# Patient Record
Sex: FEMALE | Race: WHITE | NOT HISPANIC OR LATINO | Employment: OTHER | URBAN - METROPOLITAN AREA
[De-identification: names, ages, dates, MRNs, and addresses within clinical notes are randomized per-mention and may not be internally consistent; named-entity substitution may affect disease eponyms.]

---

## 2021-02-24 ENCOUNTER — OFFICE VISIT (OUTPATIENT)
Dept: INTERNAL MEDICINE | Facility: CLINIC | Age: 56
End: 2021-02-24

## 2021-02-24 VITALS
TEMPERATURE: 96.9 F | DIASTOLIC BLOOD PRESSURE: 72 MMHG | SYSTOLIC BLOOD PRESSURE: 120 MMHG | HEART RATE: 64 BPM | HEIGHT: 60 IN | WEIGHT: 113 LBS | BODY MASS INDEX: 22.19 KG/M2

## 2021-02-24 DIAGNOSIS — E78.49 OTHER HYPERLIPIDEMIA: ICD-10-CM

## 2021-02-24 DIAGNOSIS — M81.8 OTHER OSTEOPOROSIS WITHOUT CURRENT PATHOLOGICAL FRACTURE: Primary | ICD-10-CM

## 2021-02-24 PROBLEM — M85.89 OSTEOPENIA OF MULTIPLE SITES: Status: ACTIVE | Noted: 2021-02-24

## 2021-02-24 PROCEDURE — 99203 OFFICE O/P NEW LOW 30 MIN: CPT | Performed by: INTERNAL MEDICINE

## 2021-02-24 RX ORDER — MEDROXYPROGESTERONE ACETATE 5 MG/1
TABLET ORAL
COMMUNITY

## 2021-02-24 RX ORDER — ESTRADIOL 0.5 MG/1
0.5 TABLET ORAL DAILY
COMMUNITY
Start: 2020-11-27

## 2021-02-24 NOTE — PROGRESS NOTES
Patient is a 55 y.o. female who is here to establish care for hyperlipidemia and osteoporsis.  Chief Complaint   Patient presents with   • Hyperlipidemia         HPI:    Here for evaluation of osteoporosis.  Has history of osteopenia back in 2017 and repeat in 2020 has had progression to osteoporosis.  Has started exercise regimen and increase in Ca/vit D.  Does not have FH of fragility fracture.    Also has hx of hyperlipidemia.  Has a strong FH of CAD.  Tries to eat better.     History:     Patient Active Problem List   Diagnosis   • Other osteoporosis without current pathological fracture   • Other hyperlipidemia       No past medical history on file.    Past Surgical History:   Procedure Laterality Date   •  SECTION     • CYST REMOVAL         Current Outpatient Medications on File Prior to Visit   Medication Sig   • estradiol (ESTRACE) 0.5 MG tablet Take 0.5 mg by mouth Daily.   • medroxyPROGESTERone (PROVERA) 5 MG tablet medroxyprogesterone 5 mg tablet     No current facility-administered medications on file prior to visit.        Family History   Problem Relation Age of Onset   • Hearing loss Father    • Heart attack Father        Social History     Socioeconomic History   • Marital status:      Spouse name: Not on file   • Number of children: Not on file   • Years of education: Not on file   • Highest education level: Not on file   Tobacco Use   • Smoking status: Never Smoker   • Smokeless tobacco: Never Used   Substance and Sexual Activity   • Alcohol use: Yes         Review of Systems   Constitutional: Negative for chills, fatigue, fever and unexpected weight change.   HENT: Negative for congestion, ear pain, hearing loss, rhinorrhea, sinus pressure, sore throat and trouble swallowing.    Eyes: Negative for discharge and itching.   Respiratory: Negative for cough, chest tightness and shortness of breath.    Cardiovascular: Negative for chest pain, palpitations and leg swelling.  "  Gastrointestinal: Negative for abdominal pain, blood in stool, constipation, diarrhea and vomiting.        Cologuard 2020   Endocrine: Negative for polydipsia and polyuria.   Genitourinary: Negative for difficulty urinating, dysuria, enuresis, frequency, hematuria and urgency.        11/20   Musculoskeletal: Negative for arthralgias, back pain, gait problem and joint swelling.   Skin: Negative for rash and wound.   Allergic/Immunologic: Negative for immunocompromised state.   Neurological: Negative for dizziness, syncope, weakness, light-headedness, numbness and headaches.   Hematological: Does not bruise/bleed easily.   Psychiatric/Behavioral: Negative for behavioral problems, dysphoric mood and sleep disturbance. The patient is not nervous/anxious.        /72   Pulse 64   Temp 96.9 °F (36.1 °C) (Infrared)   Ht 152.4 cm (60\")   Wt 51.3 kg (113 lb)   BMI 22.07 kg/m²       Physical Exam  Constitutional:       Appearance: Normal appearance. She is well-developed.   HENT:      Head: Normocephalic and atraumatic.      Right Ear: External ear normal.      Left Ear: External ear normal.      Nose: Nose normal.      Mouth/Throat:      Mouth: Mucous membranes are moist.      Pharynx: Oropharynx is clear.   Eyes:      Extraocular Movements: Extraocular movements intact.      Conjunctiva/sclera: Conjunctivae normal.      Pupils: Pupils are equal, round, and reactive to light.   Neck:      Musculoskeletal: Normal range of motion and neck supple.   Cardiovascular:      Rate and Rhythm: Normal rate and regular rhythm.      Heart sounds: Normal heart sounds.   Pulmonary:      Effort: Pulmonary effort is normal.      Breath sounds: Normal breath sounds.   Abdominal:      General: Bowel sounds are normal.      Palpations: Abdomen is soft.   Musculoskeletal: Normal range of motion.   Lymphadenopathy:      Cervical: No cervical adenopathy.   Skin:     General: Skin is warm and dry.   Neurological:      General: No focal " deficit present.      Mental Status: She is alert and oriented to person, place, and time.   Psychiatric:         Mood and Affect: Mood normal.         Behavior: Behavior normal.         Thought Content: Thought content normal.         Procedure:      Discussion/Summary:    Osteoporosis-consider Miacalcin as she is not interested in Prolia and has intolerance bisphosphonates, cont Ca and Vit D supplementation, cont wt bearing exercise  Hyperlipidemia-f/u labs , counseled on diet  Other-advised colonoscopy    Current Outpatient Medications:   •  estradiol (ESTRACE) 0.5 MG tablet, Take 0.5 mg by mouth Daily., Disp: , Rfl:   •  medroxyPROGESTERone (PROVERA) 5 MG tablet, medroxyprogesterone 5 mg tablet, Disp: , Rfl:         Diagnoses and all orders for this visit:    1. Other osteoporosis without current pathological fracture (Primary)    2. Other hyperlipidemia

## 2021-03-05 ENCOUNTER — TELEPHONE (OUTPATIENT)
Dept: INTERNAL MEDICINE | Facility: CLINIC | Age: 56
End: 2021-03-05

## 2021-03-08 DIAGNOSIS — M81.8 OTHER OSTEOPOROSIS WITHOUT CURRENT PATHOLOGICAL FRACTURE: Primary | ICD-10-CM
